# Patient Record
Sex: FEMALE | Race: AMERICAN INDIAN OR ALASKA NATIVE
[De-identification: names, ages, dates, MRNs, and addresses within clinical notes are randomized per-mention and may not be internally consistent; named-entity substitution may affect disease eponyms.]

---

## 2020-01-14 NOTE — EMERGENCY DEPARTMENT REPORT
ED Lower Extremity HPI





- General


Chief Complaint: Extremity Injury, Lower


Stated Complaint: LT ANKLE INJURY


Time Seen by Provider: 01/14/20 13:50


Source: patient


Mode of arrival: Ambulatory


Limitations: No Limitations





- History of Present Illness


Initial Comments: 





23 YO AA COMES TO ER P ROLLING ANKLE THIS AM. NO FALL. PT AMBULATORY, FAST TO 

ER, BECAUSE SHE IS MAD SHE HAS BEEN WAITING SO LONG. NEUROVASC INTACT. 


MD Complaint: ankle injury


-: Sudden, hour(s)


Type of Injury: eversion


Place: home


Severity: mild


Improves With: nothing


Worsens With: nothing


Context: walking





- Related Data


                                Home Medications











 Medication  Instructions  Recorded  Confirmed  Last Taken


 


Sertraline HCl [Zoloft] 25 mg PO BID 03/29/15 03/29/15 05/01/13








                                  Previous Rx's











 Medication  Instructions  Recorded  Last Taken  Type


 


Docusate Sodium [Colace] 100 mg PO BID PRN #60 capsule 03/31/15 Unknown Rx


 


Ferrous Sulfate [Feosol 325 MG tab] 325 mg PO BID #120 tablet 03/31/15 Unknown 

Rx


 


Ibuprofen [Motrin 800 MG tab] 800 mg PO TID PRN #30 tablet 03/31/15 Unknown Rx


 


Sertraline [Zoloft] 50 mg PO QDAY #30 tablet 03/31/15 Unknown Rx


 


labetaloL [Labetalol 200mg TAB] 400 mg PO BID #60 tablet 03/31/15 Unknown Rx


 


Ibuprofen [Motrin 800 MG tab] 800 mg PO Q8HR PRN #30 tablet 09/23/15 Unknown Rx


 


metroNIDAZOLE [Flagyl TAB] 500 mg PO Q12HR #14 tab 09/23/15 Unknown Rx


 


Doxylamine Succinate/Vit B6 1 each PO TID #20 tablet. 06/05/17 Unknown Rx





[Diclegis Dr 10-10 mg Tablet]    


 


Ibuprofen [Motrin 800 MG tab] 800 mg PO Q8H PRN #30 tablet 11/16/17 Unknown Rx











                                    Allergies











Allergy/AdvReac Type Severity Reaction Status Date / Time


 


latex Allergy Intermediate Itching Verified 03/29/15 06:44














ED Review of Systems


ROS: 


Stated complaint: LT ANKLE INJURY


Other details as noted in HPI





Comment: All other systems reviewed and negative





ED Past Medical Hx





- Past Medical History


Hx Hypertension: No


Hx Congestive Heart Failure: No


Hx Diabetes: Yes


Hx Deep Vein Thrombosis: No


Hx Renal Disease: No


Hx Sickle Cell Disease: No


Hx Seizures: No


Hx Psychiatric Treatment: Yes (depression)


Hx Asthma: Yes


Hx COPD: No


Hx HIV: No





- Surgical History


Past Surgical History?: No





- Family History


Family history: no significant





- Social History


Smoking Status: Current Every Day Smoker





- Medications


Home Medications: 


                                Home Medications











 Medication  Instructions  Recorded  Confirmed  Last Taken  Type


 


Sertraline HCl [Zoloft] 25 mg PO BID 03/29/15 03/29/15 05/01/13 History


 


Docusate Sodium [Colace] 100 mg PO BID PRN #60 capsule 03/31/15  Unknown Rx


 


Ferrous Sulfate [Feosol 325 MG tab] 325 mg PO BID #120 tablet 03/31/15  Unknown 

Rx


 


Ibuprofen [Motrin 800 MG tab] 800 mg PO TID PRN #30 tablet 03/31/15  Unknown Rx


 


Sertraline [Zoloft] 50 mg PO QDAY #30 tablet 03/31/15  Unknown Rx


 


labetaloL [Labetalol 200mg TAB] 400 mg PO BID #60 tablet 03/31/15  Unknown Rx


 


Ibuprofen [Motrin 800 MG tab] 800 mg PO Q8HR PRN #30 tablet 09/23/15  Unknown Rx


 


metroNIDAZOLE [Flagyl TAB] 500 mg PO Q12HR #14 tab 09/23/15  Unknown Rx


 


Doxylamine Succinate/Vit B6 1 each PO TID #20 tablet. 06/05/17  Unknown Rx





[Diclegis Dr 10-10 mg Tablet]     


 


Ibuprofen [Motrin 800 MG tab] 800 mg PO Q8H PRN #30 tablet 11/16/17  Unknown Rx














ED Physical Exam





- General


Limitations: No Limitations


General appearance: alert, in no apparent distress





- Head


Head exam: Present: atraumatic, normocephalic





- Eye


Eye exam: Present: normal appearance





- ENT


ENT exam: Present: mucous membranes moist





- Neck


Neck exam: Present: normal inspection





- Respiratory


Respiratory exam: Present: normal lung sounds bilaterally.  Absent: respiratory 

distress





- Cardiovascular


Cardiovascular Exam: Present: regular rate, normal rhythm.  Absent: systolic 

murmur, diastolic murmur, rubs, gallop





- GI/Abdominal


GI/Abdominal exam: Present: soft, normal bowel sounds





- Extremities Exam


Extremities exam: Present: normal inspection





- Back Exam


Back exam: Present: normal inspection





- Neurological Exam


Neurological exam: Present: alert, oriented X3





- Psychiatric


Psychiatric exam: Present: normal affect, normal mood





- Skin


Skin exam: Present: warm, dry, intact, normal color.  Absent: rash





ED Course


                                   Vital Signs











  01/14/20





  10:42


 


Temperature 98.4 F


 


Pulse Rate 81


 


Respiratory 16





Rate 


 


Blood Pressure 129/72


 


O2 Sat by Pulse 100





Oximetry 














ED Lower Extremity MDM





- Radiology Data


Radiology results: report reviewed, image reviewed





- Medical Decision Making





ANKLE SPRAIN





AMBULATORY





DC HOME WITH RICE TREATMENT








                                   Vital Signs











  01/14/20





  10:42


 


Temperature 98.4 F


 


Pulse Rate 81


 


Respiratory 16





Rate 


 


Blood Pressure 129/72


 


O2 Sat by Pulse 100





Oximetry 














- Differential Diagnosis


RO FX


Critical care attestation.: 


If time is entered above; I have spent that time in minutes in the direct care 

of this critically ill patient, excluding procedure time.








ED Disposition


Clinical Impression: 


 Ankle sprain





Disposition: DC-01 TO HOME OR SELFCARE


Is pt being admited?: No


Does the pt Need Aspirin: No


Condition: Stable


Instructions:  Ankle Sprain (ED)


Additional Instructions: 


ICE


REST


ELEVATE FOOT





CAN USE OVER THE COUNTER ACE BANDAGE FOR COMFORT





MOTRIN OR TYLENOL FOR PAIN





FOLLOW UP WITH DR LANDIS IF PAIN PERSISTS


REFERRAL BELOW








Referrals: 


CRISTINA LANDIS MD [Staff Physician] - 3-5 Days


Forms:  Work/School Release Form(ED)


Time of Disposition: 13:51

## 2020-01-14 NOTE — XRAY REPORT
LEFT ANKLE 2 VIEWS



INDICATION:  twisted ankle. 



COMPARISON: None.



IMPRESSION:  Mild soft tissue swelling is suspected. Normal bone mineralization.  No acute osseous fi
ndings or joint pathology is demonstrated.



Signer Name: Steve Suero Jr, MD 

Signed: 1/14/2020 11:21 AM

 Workstation Name: [id number]

## 2020-06-04 ENCOUNTER — HOSPITAL ENCOUNTER (EMERGENCY)
Dept: HOSPITAL 5 - ED | Age: 25
Discharge: HOME | End: 2020-06-04
Payer: COMMERCIAL

## 2020-06-04 VITALS — SYSTOLIC BLOOD PRESSURE: 132 MMHG | DIASTOLIC BLOOD PRESSURE: 78 MMHG

## 2020-06-04 DIAGNOSIS — O26.891: Primary | ICD-10-CM

## 2020-06-04 DIAGNOSIS — F17.200: ICD-10-CM

## 2020-06-04 DIAGNOSIS — O99.511: ICD-10-CM

## 2020-06-04 DIAGNOSIS — Z91.040: ICD-10-CM

## 2020-06-04 DIAGNOSIS — F32.9: ICD-10-CM

## 2020-06-04 DIAGNOSIS — J45.909: ICD-10-CM

## 2020-06-04 DIAGNOSIS — Z3A.12: ICD-10-CM

## 2020-06-04 DIAGNOSIS — Z79.899: ICD-10-CM

## 2020-06-04 LAB
ALBUMIN SERPL-MCNC: 4.3 G/DL (ref 3.9–5)
ALT SERPL-CCNC: 14 UNITS/L (ref 7–56)
BASOPHILS # (AUTO): 0 K/MM3 (ref 0–0.1)
BASOPHILS NFR BLD AUTO: 0.7 % (ref 0–1.8)
BUN SERPL-MCNC: 13 MG/DL (ref 7–17)
BUN/CREAT SERPL: 19 %
CALCIUM SERPL-MCNC: 8.9 MG/DL (ref 8.4–10.2)
EOSINOPHIL # BLD AUTO: 0.1 K/MM3 (ref 0–0.4)
EOSINOPHIL NFR BLD AUTO: 2.4 % (ref 0–4.3)
HCT VFR BLD CALC: 37.9 % (ref 30.3–42.9)
HEMOLYSIS INDEX: 10
HGB BLD-MCNC: 12.6 GM/DL (ref 10.1–14.3)
LYMPHOCYTES # BLD AUTO: 2.2 K/MM3 (ref 1.2–5.4)
LYMPHOCYTES NFR BLD AUTO: 36.5 % (ref 13.4–35)
MCHC RBC AUTO-ENTMCNC: 33 % (ref 30–34)
MCV RBC AUTO: 77 FL (ref 79–97)
MONOCYTES # (AUTO): 0.3 K/MM3 (ref 0–0.8)
MONOCYTES % (AUTO): 5.7 % (ref 0–7.3)
PLATELET # BLD: 231 K/MM3 (ref 140–440)
RBC # BLD AUTO: 4.91 M/MM3 (ref 3.65–5.03)

## 2020-06-04 PROCEDURE — 76801 OB US < 14 WKS SINGLE FETUS: CPT

## 2020-06-04 PROCEDURE — 76817 TRANSVAGINAL US OBSTETRIC: CPT

## 2020-06-04 PROCEDURE — 80053 COMPREHEN METABOLIC PANEL: CPT

## 2020-06-04 PROCEDURE — 86900 BLOOD TYPING SEROLOGIC ABO: CPT

## 2020-06-04 PROCEDURE — 86901 BLOOD TYPING SEROLOGIC RH(D): CPT

## 2020-06-04 PROCEDURE — 36415 COLL VENOUS BLD VENIPUNCTURE: CPT

## 2020-06-04 PROCEDURE — 85025 COMPLETE CBC W/AUTO DIFF WBC: CPT

## 2020-06-04 PROCEDURE — 84702 CHORIONIC GONADOTROPIN TEST: CPT

## 2020-06-04 NOTE — ULTRASOUND REPORT
[facility]



HISTORY: Vaginal bleeding. Positive pregnancy test.



COMPARISON: None.



TECHNIQUE: Routine transabdominal and transvaginal OB ultrasound performed.



FINDINGS:

Uterus: Normal size and echogenicity.  No uterine masses nor evidence of intrauterine gestational sac
, fetal pole or yolk sac. A small complex fluid collection in the uterine cavity measures 2.0 x 0.5 c
m.

Endometrium: 5.1 mm maximum thickness in the uterine fundus.

Right Ovary:  Normal size, blood flow and appearance measuring  3.6 x 1.6 x 1.4 cm.  

Left Ovary: Normal size, blood flow and appearance measuring  2.4 x 1.5 x 1.8 cm.  



Additional findings: No adnexal mass. A small volume of fluid in the cul-de-sac.



IMPRESSION:

1. No visible intrauterine pregnancy.  In the setting of a positive pregnancy test and vaginal bleedi
ng, main diagnostic considerations are early intrauterine pregnancy, spontaneous  or nonvisua
lized ectopic pregnancy.  Recommend correlation with serial quantitative beta-hCG.



Signer Name: Bruce Mcgowna MD 

Signed: 2020 12:17 PM

Workstation Name: [id number]

## 2020-06-04 NOTE — ULTRASOUND REPORT
[facility] ULTRASOUND



HISTORY: Vaginal bleeding. Positive pregnancy test.



COMPARISON: None.



TECHNIQUE: Routine transabdominal and transvaginal OB ultrasound performed.



FINDINGS:

Uterus: Normal size and echogenicity.  No uterine masses nor evidence of intrauterine gestational sac
, fetal pole or yolk sac. A small complex fluid collection in the uterine cavity measures 2.0 x 0.5 c
m.

Endometrium: 5.1 mm maximum thickness in the uterine fundus.

Right Ovary:  Normal size, blood flow and appearance measuring  3.6 x 1.6 x 1.4 cm.  

Left Ovary: Normal size, blood flow and appearance measuring  2.4 x 1.5 x 1.8 cm.  



Additional findings: No adnexal mass. A small volume of fluid in the cul-de-sac.



IMPRESSION:

1. No visible intrauterine pregnancy.  In the setting of a positive pregnancy test and vaginal bleedi
ng, main diagnostic considerations are early intrauterine pregnancy, spontaneous  or nonvisua
lized ectopic pregnancy.  Recommend correlation with serial quantitative beta-hCG.



Signer Name: Bruce Mcgowan MD 

Signed: 2020 12:17 PM

Workstation Name: [id number]

## 2020-06-04 NOTE — EVENT NOTE
ED Screening Note


ED Screening Note: 





13 weeks pregnant


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 

packages


states she began having lower back and and lower abd pain 


states she noticed an episode of bleeding yesterday 


still having spotting


OB/GYN: [facility] 


LNMP: early march


PMHx asthma


no allergies to meds


/P:2/A:2





This initial assessment/diagnostic orders/clinical plan/treatment(s) is/are 

subject to change based on patients health status, clinical progression and re-

assessment by fellow clinical providers in the ED. Further treatment and workup 

at subsequent clinical providers discretion. Patient/guardian urged not to elope

from the ED as their condition may be serious if not clinically assessed and 

managed. 





Initial orders include: labs, UA, US

## 2020-06-04 NOTE — EMERGENCY DEPARTMENT REPORT
ED Pregnancy HPI





- General


Chief complaint: Vaginal Bleeding


Stated complaint: PREG BLEEDING


Time Seen by Provider: 20 11:14


Source: patient


Mode of arrival: Ambulatory


Limitations: No Limitations





- History of Present Illness


Initial comments: 





25 YO AA FEMALE COMES TO ER CO OF VAG BLEED WHILE PREGNANT. SHE STATES SHE DOES 

NOT KNOW WHEN LMP WAS BUT THAT SHE HAD BLEEDING WITH CLOTS YESTERDAY. 





SHE STATES SHE IS 12 W PREGNANT AND THAT SHE IS TREATED BY OBGYN





DENIES FEVER OR CHILLS


DENIES NVD


DENIES BACK PAIN


DENIES CONSTIPATION


MD Complaint: abdominal pain, vaginal bleeding


-: Gradual


Quality: cramping


Consistency: intermittent


Improves with: none


Worsens with: none


Associated symptoms: denies other symptoms, vaginal bleeding


Vaginal bleeding: heavy, clots


Pregnant:: Yes





- Related Data


: 4


Para: 2


Ab: 2


                                  Previous Rx's











 Medication  Instructions  Recorded  Last Taken  Type


 


Sertraline [Zoloft] 50 mg PO QDAY #30 tablet 03/31/15 Unknown Rx











                                    Allergies











Allergy/AdvReac Type Severity Reaction Status Date / Time


 


latex Allergy Intermediate Itching Verified 20 10:55














ED Review of Systems


ROS: 


Stated complaint: PREG BLEEDING


Other details as noted in HPI





Comment: All other systems reviewed and negative





ED Past Medical Hx





- Past Medical History


Hx Hypertension: No


Hx Congestive Heart Failure: No


Hx Diabetes: No


Hx Deep Vein Thrombosis: No


Hx Renal Disease: No


Hx Sickle Cell Disease: No


Hx Seizures: No


Hx Psychiatric Treatment: Yes (depression)


Hx Asthma: Yes


Hx COPD: No


Hx HIV: No





- Surgical History


Past Surgical History?: No





- Family History


Family history: no significant





- Social History


Smoking Status: Current Every Day Smoker


Substance Use Type: Alcohol





- Medications


Home Medications: 


                                Home Medications











 Medication  Instructions  Recorded  Confirmed  Last Taken  Type


 


Sertraline [Zoloft] 50 mg PO QDAY #30 tablet 03/31/15  Unknown Rx














ED Physical Exam





- General


Limitations: No Limitations


General appearance: alert, in no apparent distress





- Head


Head exam: Present: atraumatic, normocephalic





- Eye


Eye exam: Present: normal appearance





- ENT


ENT exam: Present: mucous membranes moist





- Neck


Neck exam: Present: normal inspection





- Respiratory


Respiratory exam: Present: normal lung sounds bilaterally.  Absent: respiratory 

distress





- Cardiovascular


Cardiovascular Exam: Present: regular rate, normal rhythm.  Absent: systolic 

murmur, diastolic murmur, rubs, gallop





- GI/Abdominal


GI/Abdominal exam: Present: soft, normal bowel sounds





- Extremities Exam


Extremities exam: Present: normal inspection





- Back Exam


Back exam: Present: normal inspection





- Neurological Exam


Neurological exam: Present: alert, oriented X3





- Psychiatric


Psychiatric exam: Present: normal affect, normal mood





- Skin


Skin exam: Present: warm, dry, intact, normal color.  Absent: rash





ED Course


                                   Vital Signs











  20





  10:57


 


Temperature 98 F


 


Pulse Rate 89


 


Respiratory 18





Rate 


 


Blood Pressure 132/78





[Left] 


 


O2 Sat by Pulse 100





Oximetry 














ED Medical Decision Making





- Lab Data


Result diagrams: 


                                 20 11:22





                                 20 11:22





- Radiology Data


Radiology results: report reviewed, image reviewed





- Medical Decision Making








Labs











  20





  11:22 11:22 11:22


 


WBC  6.0  


 


RBC  4.91  


 


Hgb  12.6  


 


Hct  37.9  


 


MCV  77 L  


 


MCH  26 L  


 


MCHC  33  


 


RDW  13.6  


 


Plt Count  231  


 


Lymph % (Auto)  36.5 H  


 


Mono % (Auto)  5.7  


 


Eos % (Auto)  2.4  


 


Baso % (Auto)  0.7  


 


Lymph #  2.2  


 


Mono #  0.3  


 


Eos #  0.1  


 


Baso #  0.0  


 


Seg Neutrophils %  54.7  


 


Seg Neutrophils #  3.3  


 


Sodium   137 


 


Potassium   3.5 L 


 


Chloride   102.3 


 


Carbon Dioxide   21 L 


 


Anion Gap   17 


 


BUN   13 


 


Creatinine   0.7 


 


Estimated GFR   > 60 


 


BUN/Creatinine Ratio   19 


 


Glucose   102 H 


 


Calcium   8.9 


 


Total Bilirubin   0.40 


 


AST   15 


 


ALT   14 


 


Alkaline Phosphatase   75 


 


Total Protein   7.4 


 


Albumin   4.3 


 


Albumin/Globulin Ratio   1.4 


 


HCG, Quant    < 2


 


Blood Type   














  20





  11:22


 


WBC 


 


RBC 


 


Hgb 


 


Hct 


 


MCV 


 


MCH 


 


MCHC 


 


RDW 


 


Plt Count 


 


Lymph % (Auto) 


 


Mono % (Auto) 


 


Eos % (Auto) 


 


Baso % (Auto) 


 


Lymph # 


 


Mono # 


 


Eos # 


 


Baso # 


 


Seg Neutrophils % 


 


Seg Neutrophils # 


 


Sodium 


 


Potassium 


 


Chloride 


 


Carbon Dioxide 


 


Anion Gap 


 


BUN 


 


Creatinine 


 


Estimated GFR 


 


BUN/Creatinine Ratio 


 


Glucose 


 


Calcium 


 


Total Bilirubin 


 


AST 


 


ALT 


 


Alkaline Phosphatase 


 


Total Protein 


 


Albumin 


 


Albumin/Globulin Ratio 


 


HCG, Quant 


 


Blood Type  A POSITIVE








labs noted





hcg < 2





A pos





US noted





Pt informed she is not pregnant. She broke down in tears. We discussed her 

anxiety and psychosomatic symptoms. Allowed her to verbalize concerns. She 

shared that her ob had worked her up for fibroids/cysts- and ultrasounds have 

all been neg - and ob told her she was not pregnant. She insists she is. Pt adds

 she had BC shot in April. 





Pt being dc to home with obgyn follow up. 








                                   Vital Signs











  20





  10:57


 


Temperature 98 F


 


Pulse Rate 89


 


Respiratory 18





Rate 


 


Blood Pressure 132/78





[Left] 


 


O2 Sat by Pulse 100





Oximetry 














- Differential Diagnosis


ro ab


Critical care attestation.: 


If time is entered above; I have spent that time in minutes in the direct care 

of this critically ill patient, excluding procedure time.








ED Disposition


Clinical Impression: 


 Abdominal pain





Disposition: DC-01 TO HOME OR SELFCARE


Is pt being admited?: No


Does the pt Need Aspirin: No


Condition: Stable


Additional Instructions: 


FOLLOW UP WITH YASEMIN AT [facility] ASAP





FOLLOW UP WITH PCP FOR MEDICAL EXAM


REFERRAL BELOW











NEG PREGNANCY TODAY AND UTERUS WITHOUT FETUS


Referrals: 


PRIMARY MD FOREST [Primary Care Provider] - 3-5 Days


BEAR LANDRY MD [Staff Physician] - 3-5 Days


Time of Disposition: 12:40